# Patient Record
Sex: MALE | Race: WHITE | NOT HISPANIC OR LATINO | Employment: FULL TIME | URBAN - METROPOLITAN AREA
[De-identification: names, ages, dates, MRNs, and addresses within clinical notes are randomized per-mention and may not be internally consistent; named-entity substitution may affect disease eponyms.]

---

## 2017-10-15 ENCOUNTER — OFFICE VISIT (OUTPATIENT)
Dept: RETAIL CLINIC | Facility: CLINIC | Age: 37
End: 2017-10-15

## 2017-10-15 VITALS
DIASTOLIC BLOOD PRESSURE: 88 MMHG | HEIGHT: 70 IN | BODY MASS INDEX: 44.38 KG/M2 | WEIGHT: 310 LBS | HEART RATE: 102 BPM | SYSTOLIC BLOOD PRESSURE: 162 MMHG | RESPIRATION RATE: 18 BRPM | TEMPERATURE: 97.8 F

## 2017-10-15 DIAGNOSIS — R03.0 ELEVATED BLOOD PRESSURE READING: ICD-10-CM

## 2017-10-15 DIAGNOSIS — H61.23 BILATERAL IMPACTED CERUMEN: Primary | ICD-10-CM

## 2017-10-15 DIAGNOSIS — H60.333 ACUTE SWIMMER'S EAR OF BOTH SIDES: ICD-10-CM

## 2017-10-15 PROCEDURE — 99202 OFFICE O/P NEW SF 15 MIN: CPT | Performed by: NURSE PRACTITIONER

## 2017-10-15 PROCEDURE — 69210 REMOVE IMPACTED EAR WAX UNI: CPT | Performed by: NURSE PRACTITIONER

## 2017-10-15 NOTE — PATIENT INSTRUCTIONS
"Otitis Externa  Otitis externa is a germ infection in the outer ear. The outer ear is the area from the eardrum to the outside of the ear. Otitis externa is sometimes called \"swimmer's ear.\"  HOME CARE  · Put drops in the ear as told by your doctor.  · Only take medicine as told by your doctor.  · If you have diabetes, your doctor may give you more directions. Follow your doctor's directions.  · Keep all doctor visits as told.  To avoid another infection:  · Keep your ear dry. Use the corner of a towel to dry your ear after swimming or bathing.  · Avoid scratching or putting things inside your ear.  · Avoid swimming in lakes, dirty water, or pools that use a chemical called chlorine poorly.  · You may use ear drops after swimming. Combine equal amounts of white vinegar and alcohol in a bottle. Put 3 or 4 drops in each ear.  GET HELP IF:   · You have a fever.  · Your ear is still red, puffy (swollen), or painful after 3 days.  · You still have yellowish-white fluid (pus) coming from the ear after 3 days.  · Your redness, puffiness, or pain gets worse.  · You have a really bad headache.  · You have redness, puffiness, pain, or tenderness behind your ear.  MAKE SURE YOU:   · Understand these instructions.  · Will watch your condition.  · Will get help right away if you are not doing well or get worse.     This information is not intended to replace advice given to you by your health care provider. Make sure you discuss any questions you have with your health care provider.     Document Released: 06/05/2009 Document Revised: 01/08/2016 Document Reviewed: 09/26/2016  [employer] Interactive Patient Education ©2017 [employer]  Earwax Buildup  Your ears make a substance called earwax. It may also be called cerumen. Sometimes, too much earwax builds up in your ear canal. This can cause ear pain and make it harder for you to hear.  CAUSES  This condition is caused by too much earwax production or buildup.  RISK FACTORS  The " following factors may make you more likely to develop this condition:  · Cleaning your ears often with swabs.  · Having narrow ear canals.  · Having earwax that is overly thick or sticky.  · Having eczema.  · Being dehydrated.  SYMPTOMS  Symptoms of this condition include:  · Reduced hearing.  · Ear drainage.  · Ear pain.  · Ear itch.  · A feeling of fullness in the ear or feeling that the ear is plugged.  · Ringing in the ear.  · Coughing.  DIAGNOSIS  Your health care provider can diagnose this condition based on your symptoms and medical history. Your health care provider will also do an ear exam to look inside your ear with a scope (otoscope). You may also have a hearing test.  TREATMENT  Treatment for this condition includes:  · Over-the-counter or prescription ear drops to soften the earwax.  · Earwax removal by a health care provider. This may be done:    By flushing the ear with body-temperature water.    With a medical instrument that has a loop at the end (earwax curette).    With a suction device.  HOME CARE INSTRUCTIONS  · Take over-the-counter and prescription medicines only as told by your health care provider.  · Do not put any objects, including an ear swab, into your ear. You can clean the opening of your ear canal with a washcloth.  · Drink enough water to keep your urine clear or pale yellow.  · If you have frequent earwax buildup or you use hearing aids, consider seeing your health care provider every 6-12 months for routine preventive ear cleanings. Keep all follow-up visits as told by your health care provider.  SEEK MEDICAL CARE IF:  · You have ear pain.  · Your condition does not improve with treatment.  · You have hearing loss.  · You have blood, pus, or other fluid coming from your ear.     This information is not intended to replace advice given to you by your health care provider. Make sure you discuss any questions you have with your health care provider.     Document Released: 01/25/2006  Document Revised: 04/10/2017 Document Reviewed: 08/03/2016  [employer] Interactive Patient Education ©2017 [employer]    See your regular doctor for symptoms that persist, ear drainage or fever  I recommend follow-up with your primary care provider for high blood pressure readings this coming week

## 2017-10-15 NOTE — PROGRESS NOTES
"Subjective   Hearing Problem and Cerumen Impaction        History of Present Illness   Frandy Krishnan is a 37 y.o. male who complains of ear wax buildup and decreased hearing bilateral ears x several weeks. Was told by PCP that he had cerumen impaction. Patient has been using Debrox per bottle instructions without relief. The patient denies ear pain or drainage. Had sinus infection with resolution of symptoms with antibiotics.  He is currently taking antibiotics for \"urinary infection\".     History Obtained from: Patient    Past Medical History:   Diagnosis Date   • Allergic    • Anxiety    • Hypertension      History reviewed. No pertinent surgical history.  Social History     Social History   • Marital status:      Spouse name: N/A   • Number of children: N/A   • Years of education: N/A     Occupational History   • Not on file.     Social History Main Topics   • Smoking status: Former Smoker     Packs/day: 1.00     Years: 15.00     Types: Cigarettes   • Smokeless tobacco: Not on file   • Alcohol use Defer   • Drug use: Defer   • Sexual activity: Defer     Other Topics Concern   • Not on file     Social History Narrative   • No narrative on file     History reviewed. No pertinent family history.  Allergies not on file  Current Outpatient Prescriptions   Medication Sig Dispense Refill   • Cephalexin (KEFLEX PO) Take  by mouth.     • Citalopram Hydrobromide (CELEXA PO) Take  by mouth.     • neomycin-polymyxin-hydrocortisone (CORTISPORIN) 3.5-40847-6 otic solution Administer 3 drops into both ears 3 (Three) Times a Day for 3 days. 10 mL 0     No current facility-administered medications for this visit.             Review of Systems   Constitutional: Negative for chills and fever.   HENT: Positive for hearing loss (bilateral). Negative for ear discharge, sore throat and trouble swallowing. Sinus pressure: resolved.    Respiratory: Negative for cough and wheezing.    Cardiovascular: Negative for chest pain, " "palpitations and leg swelling.   Gastrointestinal: Negative for nausea and vomiting.   Neurological: Positive for dizziness (intermittent) and headaches (intermittent).   Hematological: Negative for adenopathy.   Psychiatric/Behavioral: Negative.        Objective     VITAL SIGNS:   Vitals:    10/15/17 1659   BP: 162/88   Pulse: 102   Resp: 18   Temp: 97.8 °F (36.6 °C)   TempSrc: Oral   Weight: (!) 310 lb (141 kg)   Height: 70\" (177.8 cm)   Body mass index is 44.48 kg/(m^2).    Physical Exam   Constitutional: He appears well-developed and well-nourished. He does not appear ill. No distress.   HENT:   Head: Normocephalic and atraumatic.   Right Ear: External ear normal. Decreased hearing is noted.   Left Ear: External ear normal. Decreased hearing is noted.   Nose: Nose normal.   Mouth/Throat: Uvula is midline and mucous membranes are normal.   Bilateral cerumen impaction   Neck: Phonation normal. Neck supple. No JVD present.   Cardiovascular: Normal rate and regular rhythm.    No murmur heard.  Pulmonary/Chest: Effort normal and breath sounds normal.   Neurological: He is alert. He is not disoriented. Gait normal.   Skin: Skin is warm and dry. No cyanosis.   Psychiatric: His behavior is normal.       LABS: No results found for this or any previous visit.    CLINICAL QUALITY MEASURES:  Tobacco Screening & Intervention Screened & identified as tobacco non-user. Former smoker   WEIGHT SCREENING/BMI  Not eligible, overweight & managed by other physician     Assessment/Plan   Bilateral impacted cerumen  Acute swimmer's ear of both sides  Elevated blood pressure reading  Procedure:   Under direct visualization with otoscope, cerumen impaction successfully removed with H20/H2O2 irrigation and cerumen curettage. Patient tolerated procedure well, TM intact bilaterally, mildly injected. Bilateral ear canals with brisk erythema and edema.     Other orders  -     neomycin-polymyxin-hydrocortisone (CORTISPORIN) 3.5-03649-0 otic " solution; Administer 3 drops into both ears 3 (Three) Times a Day for 3 days.      PLAN:  Patient should follow up with primary care provider for elevated blood pressure. He should also see PCP for ear pain, drainage or other concerning symptoms.   Discussed s/s stroke with instructions to call 911 or go to ER for any signs of stroke.   The patient voiced understanding and agreement to the patient treatment plan and instructions     IVONNE Tillman

## 2023-04-21 NOTE — PROGRESS NOTES
Chief Complaint  Sleep Apnea    Subjective     History of Present Illness:  Frandy Krishnan is a 42 y.o. male with a history of NATALIE on PAP therapy, hypertension, and anxiety.  He brings his machine/chip today.  He has had the device about 5-6 years. He reports that his wife has witnessed him frequently waking and screaming at night. He also reports that he is not rested. He tosses and turns along with the screaming. His ggrandmother had parkinson's disease. He snores despite using his device. Once he wakes up, he cannot get back to sleep. He will be up for 2 hours before going back to sleep. This does not happen every night. He has tried CBD gummies but this did not help.  He tried melatonin many many years ago.  He does use chewing tobacco for over 5 years, drinks alcohol monthly or less, denies use of illicit drugs.  Patient drinks 2 cups of regular coffee, and 2 regular teas daily.  The patient's wife reports witnessed episodes of apnea and pauses in his sleep which occur occasionally.  Additionally despite use of PAP therapy he continues to snore.  This occurs every night and is described as heavy and in all sleeping positions.  He also kicks and jerks frequently talks and screams in his sleep.  Patient is tired during the day despite use of PAP therapy.    Further details are as follows:    Goodwin Scale is (out of 24): Total score: 7     Estimated average amount of sleep per night: 6  Number of times he wakes up at night: 1-2 times per week. Not sure how many times he wakes and goes back to sleep.    Difficulty falling back asleep: no  It usually takes 20 minutes to go to sleep.  He feels sleepy upon waking up: yes  Rotating or night shift work: no    Drowsiness/Sleepiness:  He exhibits the following:  excessive daytime sleepiness  excessive daytime fatigue  falls asleep watching TV    Snoring/Breathing:  He exhibits the following:  loud snoring, snores in all sleep positions and awakens with dry mouth    Head  Injury:  He exhibits the following:  No    Reflux:  He describes the following:  Not waking    Narcolepsy:  He exhibits the following:  none    RLS/PLMs:  He describes the following:  moves or jerks during sleep  discomfort in legs with an urge to move them    Insomnia:  He describes the following:  None    Parasomnia:  He exhibits the following:  sleep talks  wakes up screaming at night  excessive sweating while sleeping  grinds teeth    Weight:       04/24/23  1517   Weight: 123 kg (272 lb 3.2 oz)      Weight change in the last year:  gain: 10-15 lbs    The patient's relevant past medical, surgical, family, and social history reviewed and updated in Epic as appropriate.    Review of Systems   Constitutional: Positive for fatigue.   HENT: Positive for drooling, sinus pressure, sneezing and sore throat.    Eyes: Positive for itching.   Respiratory: Positive for apnea.    Cardiovascular: Negative.    Gastrointestinal: Negative.    Endocrine: Negative.    Genitourinary: Negative.    Musculoskeletal: Positive for back pain, neck pain and neck stiffness.   Skin: Negative.    Allergic/Immunologic: Positive for environmental allergies.   Neurological: Positive for dizziness and headache.   Hematological: Negative.    Psychiatric/Behavioral: Positive for agitation and decreased concentration. The patient is nervous/anxious.    All other systems reviewed and are negative.      PMH:    Past Medical History:   Diagnosis Date   • Allergic    • Anxiety    • Hypertension    • NATALIE on CPAP      History reviewed. No pertinent surgical history.    Not on File    MEDS:  Prior to Admission medications    Medication Sig Start Date End Date Taking? Authorizing Provider   Cephalexin (KEFLEX PO) Take  by mouth.    ProviderLauren MD   Citalopram Hydrobromide (CELEXA PO) Take  by mouth.    ProviderLauren MD       FH:  History reviewed. No pertinent family history.    Objective   Vital Signs:  /84 (BP Location: Left arm,  "Patient Position: Sitting)   Pulse 77   Ht 177.8 cm (70\")   Wt 123 kg (272 lb 3.2 oz)   SpO2 98%   BMI 39.06 kg/m²     BMI cannot be calculated due to outdated height or weight values.  Please input a current height/weight in Vitals and re-renter [id number] in Note to pull in correct documentation based on BMI range.        Physical Exam  Vitals reviewed.   Constitutional:       Appearance: Normal appearance.   HENT:      Head: Normocephalic and atraumatic.      Nose: Nose normal.      Mouth/Throat:      Mouth: Mucous membranes are moist.   Cardiovascular:      Rate and Rhythm: Normal rate and regular rhythm.      Heart sounds: No murmur heard.    No friction rub. No gallop.   Pulmonary:      Effort: Pulmonary effort is normal. No respiratory distress.      Breath sounds: Normal breath sounds. No wheezing or rhonchi.   Neurological:      Mental Status: He is alert and oriented to person, place, and time.   Psychiatric:         Behavior: Behavior normal.         Mallampati Score: IV (only hard palate visible)    Result Review :  The following data was reviewed by: IVONNE Lewis on 04/24/2023:    Data reviewed: Pap report:   Pap report:  AHI: 2/H  Overall usage: 82/90 (91.1%)  Greater than 4-hour usage: 81/90 (90%)  Average usage (days used): 7 hours 14 minutes  90th percentile pressure 11.3 cm H2O  Settings: Auto CPAP-a flex-8/16 cm H2O.       Assessment and Plan  Frandy Krishnan is a 42 y.o. male who presents to establish care for NATALIE on PAP therapy.  Patient continues to be tired despite use of PAP therapy.  Of additional concern is his sleep behavior which includes frequent minutes, and waking up screaming at night. He is also snoring through his mask.  I have reviewed the patient's Pap report.  Patient is in full compliance with overall usage at 91.1%, and greater than 4-hour usage at 90%.  Sleep apnea is controlled with an AHI of 2/H.      With regard to sleep behaviors/parasomnia I am concerned " about a REM sleep disorder.  Additionally, though his device reports an excellent AHI he continues to have witnessed episodes of snoring with PAP usage.  I will order a in lab study for further evaluation of parasomnia, and to ensure that PAP therapy is appropriately taking care of his sleep apnea.  His machine is at least 5 if not 6 or more years old and it may not be accurately capturing data.  I have discussed ramifications of parasomnias including the increased risk of neurological disorders.  I have suggested he may try extended release melatonin in the interim to see if this is helpful.     With regard to his daytime hypersomnia I suspect this is multifactorial in nature.  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  Additionally as described above I am concerned that PAP therapy is not adequately treating his sleep apnea contributing to nonrestorative sleep.       Diagnoses and all orders for this visit:    1. Obstructive sleep apnea, adult (Primary)  -     Polysomnography 4 or More Parameters With CPAP; Future    2. Snoring  -     Polysomnography 4 or More Parameters With CPAP; Future    3. Hypersomnia with sleep apnea  -     Polysomnography 4 or More Parameters With CPAP; Future    4. Parasomnia in conditions classified elsewhere  -     Polysomnography 4 or More Parameters With CPAP; Future    5. Obesity (BMI 35.0-39.9 without comorbidity)                 I discussed the consequences of uncontrolled sleep apnea including hypertension, heart disease, diabetes, stroke, and dementia. I further discussed sleep apnea therapeutic options including CPAP, Weight loss, Oral dental appliance, and surgery.    I spent 45 minutes caring for Frandy on this date of service. This time includes time spent by me in the following activities: preparing for the visit, reviewing tests, obtaining and/or reviewing a separately obtained history, performing a medically appropriate examination and/or evaluation,  counseling and educating the patient/family/caregiver, ordering medications, tests, or procedures, documenting information in the medical record and independently interpreting results and communicating that information with the patient/family/caregiver.      Follow Up  Return for Follow up after study.  Patient was given instructions and counseling regarding his condition or for health maintenance advice. Please see specific information pulled into the AVS if appropriate.     IVONNE Douglas, ACNP-BC  Pulmonology, Critical Care, and Sleep Medicine

## 2023-04-24 ENCOUNTER — OFFICE VISIT (OUTPATIENT)
Dept: SLEEP MEDICINE | Facility: CLINIC | Age: 43
End: 2023-04-24
Payer: COMMERCIAL

## 2023-04-24 VITALS
HEIGHT: 70 IN | SYSTOLIC BLOOD PRESSURE: 126 MMHG | BODY MASS INDEX: 38.97 KG/M2 | WEIGHT: 272.2 LBS | HEART RATE: 77 BPM | OXYGEN SATURATION: 98 % | DIASTOLIC BLOOD PRESSURE: 84 MMHG

## 2023-04-24 DIAGNOSIS — G47.54 PARASOMNIA IN CONDITIONS CLASSIFIED ELSEWHERE: ICD-10-CM

## 2023-04-24 DIAGNOSIS — G47.30 HYPERSOMNIA WITH SLEEP APNEA: ICD-10-CM

## 2023-04-24 DIAGNOSIS — E66.9 OBESITY (BMI 35.0-39.9 WITHOUT COMORBIDITY): ICD-10-CM

## 2023-04-24 DIAGNOSIS — G47.10 HYPERSOMNIA WITH SLEEP APNEA: ICD-10-CM

## 2023-04-24 DIAGNOSIS — G47.33 OBSTRUCTIVE SLEEP APNEA, ADULT: Primary | ICD-10-CM

## 2023-04-24 DIAGNOSIS — R06.83 SNORING: ICD-10-CM

## 2023-04-24 PROCEDURE — 99204 OFFICE O/P NEW MOD 45 MIN: CPT | Performed by: NURSE PRACTITIONER

## 2023-10-23 ENCOUNTER — HOSPITAL ENCOUNTER (OUTPATIENT)
Dept: SLEEP MEDICINE | Facility: HOSPITAL | Age: 43
Discharge: HOME | End: 2023-10-23
Admitting: NURSE PRACTITIONER
Payer: COMMERCIAL

## 2023-10-23 DIAGNOSIS — G47.30 HYPERSOMNIA WITH SLEEP APNEA: ICD-10-CM

## 2023-10-23 DIAGNOSIS — G47.33 OBSTRUCTIVE SLEEP APNEA, ADULT: ICD-10-CM

## 2023-10-23 DIAGNOSIS — G47.54 PARASOMNIA IN CONDITIONS CLASSIFIED ELSEWHERE: ICD-10-CM

## 2023-10-23 DIAGNOSIS — G47.10 HYPERSOMNIA WITH SLEEP APNEA: ICD-10-CM

## 2023-10-23 DIAGNOSIS — R06.83 SNORING: ICD-10-CM

## 2023-10-23 PROCEDURE — 95811 POLYSOM 6/>YRS CPAP 4/> PARM: CPT
